# Patient Record
Sex: FEMALE | Race: WHITE | NOT HISPANIC OR LATINO | URBAN - METROPOLITAN AREA
[De-identification: names, ages, dates, MRNs, and addresses within clinical notes are randomized per-mention and may not be internally consistent; named-entity substitution may affect disease eponyms.]

---

## 2019-01-31 ENCOUNTER — EMERGENCY (EMERGENCY)
Facility: HOSPITAL | Age: 69
LOS: 0 days | Discharge: HOME | End: 2019-01-31
Attending: EMERGENCY MEDICINE | Admitting: EMERGENCY MEDICINE
Payer: MEDICARE

## 2019-01-31 VITALS
OXYGEN SATURATION: 100 % | HEART RATE: 76 BPM | DIASTOLIC BLOOD PRESSURE: 74 MMHG | SYSTOLIC BLOOD PRESSURE: 136 MMHG | RESPIRATION RATE: 18 BRPM

## 2019-01-31 VITALS — WEIGHT: 175.05 LBS

## 2019-01-31 DIAGNOSIS — K62.5 HEMORRHAGE OF ANUS AND RECTUM: ICD-10-CM

## 2019-01-31 DIAGNOSIS — Z79.82 LONG TERM (CURRENT) USE OF ASPIRIN: ICD-10-CM

## 2019-01-31 DIAGNOSIS — K92.2 GASTROINTESTINAL HEMORRHAGE, UNSPECIFIED: ICD-10-CM

## 2019-01-31 DIAGNOSIS — R19.7 DIARRHEA, UNSPECIFIED: ICD-10-CM

## 2019-01-31 LAB
ABO RH CONFIRMATION: SIGNIFICANT CHANGE UP
ALBUMIN SERPL ELPH-MCNC: 3.8 G/DL — SIGNIFICANT CHANGE UP (ref 3.3–5)
ALP SERPL-CCNC: 87 U/L — SIGNIFICANT CHANGE UP (ref 40–120)
ALT FLD-CCNC: 34 U/L — SIGNIFICANT CHANGE UP (ref 12–78)
ANION GAP SERPL CALC-SCNC: 7 MMOL/L — SIGNIFICANT CHANGE UP (ref 5–17)
APTT BLD: 29.8 SEC — SIGNIFICANT CHANGE UP (ref 27.5–36.3)
AST SERPL-CCNC: 21 U/L — SIGNIFICANT CHANGE UP (ref 15–37)
BASOPHILS # BLD AUTO: 0.02 K/UL — SIGNIFICANT CHANGE UP (ref 0–0.2)
BASOPHILS NFR BLD AUTO: 0.3 % — SIGNIFICANT CHANGE UP (ref 0–2)
BILIRUB SERPL-MCNC: 0.4 MG/DL — SIGNIFICANT CHANGE UP (ref 0.2–1.2)
BLD GP AB SCN SERPL QL: SIGNIFICANT CHANGE UP
BUN SERPL-MCNC: 21 MG/DL — SIGNIFICANT CHANGE UP (ref 7–23)
CALCIUM SERPL-MCNC: 8.9 MG/DL — SIGNIFICANT CHANGE UP (ref 8.5–10.1)
CHLORIDE SERPL-SCNC: 106 MMOL/L — SIGNIFICANT CHANGE UP (ref 96–108)
CO2 SERPL-SCNC: 25 MMOL/L — SIGNIFICANT CHANGE UP (ref 22–31)
CREAT SERPL-MCNC: 0.88 MG/DL — SIGNIFICANT CHANGE UP (ref 0.5–1.3)
EOSINOPHIL # BLD AUTO: 0.07 K/UL — SIGNIFICANT CHANGE UP (ref 0–0.5)
EOSINOPHIL NFR BLD AUTO: 0.9 % — SIGNIFICANT CHANGE UP (ref 0–6)
GLUCOSE SERPL-MCNC: 114 MG/DL — HIGH (ref 70–99)
HCT VFR BLD CALC: 43.7 % — SIGNIFICANT CHANGE UP (ref 34.5–45)
HGB BLD-MCNC: 14.7 G/DL — SIGNIFICANT CHANGE UP (ref 11.5–15.5)
IMM GRANULOCYTES NFR BLD AUTO: 0.3 % — SIGNIFICANT CHANGE UP (ref 0–1.5)
INR BLD: 0.99 RATIO — SIGNIFICANT CHANGE UP (ref 0.88–1.16)
LACTATE SERPL-SCNC: 1.1 MMOL/L — SIGNIFICANT CHANGE UP (ref 0.7–2)
LIDOCAIN IGE QN: 140 U/L — SIGNIFICANT CHANGE UP (ref 73–393)
LYMPHOCYTES # BLD AUTO: 1.21 K/UL — SIGNIFICANT CHANGE UP (ref 1–3.3)
LYMPHOCYTES # BLD AUTO: 15.5 % — SIGNIFICANT CHANGE UP (ref 13–44)
MCHC RBC-ENTMCNC: 30.2 PG — SIGNIFICANT CHANGE UP (ref 27–34)
MCHC RBC-ENTMCNC: 33.6 GM/DL — SIGNIFICANT CHANGE UP (ref 32–36)
MCV RBC AUTO: 89.9 FL — SIGNIFICANT CHANGE UP (ref 80–100)
MONOCYTES # BLD AUTO: 0.79 K/UL — SIGNIFICANT CHANGE UP (ref 0–0.9)
MONOCYTES NFR BLD AUTO: 10.1 % — SIGNIFICANT CHANGE UP (ref 2–14)
NEUTROPHILS # BLD AUTO: 5.72 K/UL — SIGNIFICANT CHANGE UP (ref 1.8–7.4)
NEUTROPHILS NFR BLD AUTO: 72.9 % — SIGNIFICANT CHANGE UP (ref 43–77)
NRBC # BLD: 0 /100 WBCS — SIGNIFICANT CHANGE UP (ref 0–0)
PLATELET # BLD AUTO: 247 K/UL — SIGNIFICANT CHANGE UP (ref 150–400)
POTASSIUM SERPL-MCNC: 3.6 MMOL/L — SIGNIFICANT CHANGE UP (ref 3.5–5.3)
POTASSIUM SERPL-SCNC: 3.6 MMOL/L — SIGNIFICANT CHANGE UP (ref 3.5–5.3)
PROT SERPL-MCNC: 8.1 GM/DL — SIGNIFICANT CHANGE UP (ref 6–8.3)
PROTHROM AB SERPL-ACNC: 11 SEC — SIGNIFICANT CHANGE UP (ref 10–12.9)
RBC # BLD: 4.86 M/UL — SIGNIFICANT CHANGE UP (ref 3.8–5.2)
RBC # FLD: 14 % — SIGNIFICANT CHANGE UP (ref 10.3–14.5)
SODIUM SERPL-SCNC: 138 MMOL/L — SIGNIFICANT CHANGE UP (ref 135–145)
TYPE + AB SCN PNL BLD: SIGNIFICANT CHANGE UP
WBC # BLD: 7.83 K/UL — SIGNIFICANT CHANGE UP (ref 3.8–10.5)
WBC # FLD AUTO: 7.83 K/UL — SIGNIFICANT CHANGE UP (ref 3.8–10.5)

## 2019-01-31 PROCEDURE — 99284 EMERGENCY DEPT VISIT MOD MDM: CPT | Mod: 25

## 2019-01-31 RX ORDER — SODIUM CHLORIDE 9 MG/ML
1000 INJECTION INTRAMUSCULAR; INTRAVENOUS; SUBCUTANEOUS ONCE
Qty: 0 | Refills: 0 | Status: COMPLETED | OUTPATIENT
Start: 2019-01-31 | End: 2019-01-31

## 2019-01-31 RX ADMIN — SODIUM CHLORIDE 1000 MILLILITER(S): 9 INJECTION INTRAMUSCULAR; INTRAVENOUS; SUBCUTANEOUS at 18:00

## 2019-01-31 RX ADMIN — SODIUM CHLORIDE 1000 MILLILITER(S): 9 INJECTION INTRAMUSCULAR; INTRAVENOUS; SUBCUTANEOUS at 19:10

## 2019-01-31 NOTE — ED ADULT NURSE NOTE - NSIMPLEMENTINTERV_GEN_ALL_ED
Implemented All Fall with Harm Risk Interventions:  [place] to call system. Call bell, personal items and telephone within reach. Instruct patient to call for assistance. Room bathroom lighting operational. Non-slip footwear when patient is off stretcher. Physically safe environment: no spills, clutter or unnecessary equipment. Stretcher in lowest position, wheels locked, appropriate side rails in place. Provide visual cue, wrist band, yellow gown, etc. Monitor gait and stability. Monitor for mental status changes and reorient to person, place, and time. Review medications for side effects contributing to fall risk. Reinforce activity limits and safety measures with patient and family. Provide visual clues: red socks.

## 2019-01-31 NOTE — ED ADULT NURSE NOTE - CHPI ED NUR SYMPTOMS NEG
no chills/no dysuria/no hematuria/no vomiting/no fever/no nausea/no burning urination/no abdominal distension/no diarrhea

## 2019-01-31 NOTE — ED PROVIDER NOTE - GASTROINTESTINAL, MLM
Abdomen soft, mild epigastric tenderness w/o any guarding. Abdomen soft, mild epigastric tenderness w/o any guarding. Rectal w. non bleeding external hemorrhoids, no stool or blood in rectal vault - Chaperone Juan C Jane

## 2019-01-31 NOTE — ED PROVIDER NOTE - ATTENDING CONTRIBUTION TO CARE
Dr. Washington: I have personally performed a face to face bedside history and physical examination of this patient. I have discussed the history, examination, review of systems, assessment and plan of management with the resident. I have reviewed the electronic medical record and amended it to reflect my history, review of systems, physical exam, assessment and plan.

## 2019-01-31 NOTE — ED ADULT NURSE NOTE - OBJECTIVE STATEMENT
pt states multiple episodes of blood tinged bowel movements since this morning w/ associated abd cramping. denies weakness, vomiting recent travel or sick contacts

## 2019-01-31 NOTE — ED PROVIDER NOTE - CARE PLAN
Principal Discharge DX:	BRBPR (bright red blood per rectum)  Assessment and plan of treatment:	1) Please return to the ED should you have any new or worsening symptoms, worsening pain, develop worsening bleeding or any concerning symptoms  2) Please follow up with Dr. Espinosa tomorrow - please call office  3) Please hold aspirin until GI bleeding resolves

## 2019-01-31 NOTE — ED PROVIDER NOTE - MEDICAL DECISION MAKING DETAILS
Omar Lozano (Resident): 24 hours of bright red blood mix w/ diarrhea - no fevers, abd benign - low concern for C. diff - given quantity of BRB, would consider admission for IV hydration and possible colonoscopy -

## 2019-01-31 NOTE — ED PROVIDER NOTE - PROGRESS NOTE DETAILS
Jimi NP for Dr. Washington: 69 y/o female with a PMHx of glaucoma, T cell lymphoma, carotid artery dissection, on baby ASA presents to the ED c/o dairrhea and GI bleed since last night. Pt had 3 episodes of diarrhea last night, then 1 episode of stool with blood and then had multiple episodes of just blood when using the bathroom. Blood was bright red. Pt states she has a h/o of diarrhea when eating certain meals. Denies fever, SOB or pain. On ASA. Recent Augmentin use for bronchitis 3 weeks ago.  Nonsmoker. Occasional EtOH use. No recent travel. Allergies: Vibramycin, Cipro. PMD: Dr. Sosa.    Physical Exam: CONSTITUTIONAL: white female adult. Alert. Not acutely ill. well appearing, well nourished, and in no apparent distress. EYES: clear bilaterally.  EOMI. +conjunctivae pink. Pupils equal, round, and reactive to light. ENMT: Nasal mucosa clear.  Mouth with normal mucosa  Throat has no vesicles, no oropharyngeal exudates and uvula is midline. CARDIAC: normal rate, regular rhythm, and no murmur. Normal radial pulse. RESPIRATORY: breath sounds clear and equal bilaterally. Respirations normal. GASTROINTESTINAL: abdomen soft, non-tender and non-distended. Bowel sounds positive. Rectal exam as per resident. MUSCULOSKELETAL: range of motion is not limited and there is no muscle tenderness. MAEx4 no focal swelling, tenderness. NEUROLOGICAL: sensation is normal and strength is normal. SKIN: skin normal color for race, warm, dry and intact. No pallor or diaphoresis. Jimi NP for Dr. Washington: 69 y/o female with a PMHx of glaucoma, T cell lymphoma, carotid artery dissection, on baby ASA presents to the ED c/o dairrhea and GI bleed since last night. Pt had 3 episodes of diarrhea last night, then 1 episode of stool with blood and then had multiple episodes of just blood when using the bathroom. Blood was bright red. Pt states she has a h/o of diarrhea when eating certain meals. Denies fever, SOB or pain. Recent Augmentin use for bronchitis 3 weeks ago.  Nonsmoker. Occasional EtOH use. No recent travel. Allergies: Vibramycin, Cipro. PMD: Dr. Sosa.    Physical Exam: CONSTITUTIONAL: white female adult. Alert. Not acutely ill. well appearing, well nourished, and in no apparent distress. EYES: clear bilaterally.  EOMI. +conjunctivae pink. Pupils equal, round, and reactive to light. ENMT: Nasal mucosa clear.  Mouth with normal mucosa  Throat has no vesicles, no oropharyngeal exudates and uvula is midline. CARDIAC: normal rate, regular rhythm, and no murmur. Normal radial pulse. RESPIRATORY: breath sounds clear and equal bilaterally. Respirations normal. GASTROINTESTINAL: abdomen soft, non-tender and non-distended. Bowel sounds positive. Rectal exam as per resident. MUSCULOSKELETAL: range of motion is not limited and there is no muscle tenderness. MAEx4 no focal swelling, tenderness. NEUROLOGICAL: sensation is normal and strength is normal. SKIN: skin normal color for race, warm, dry and intact. No pallor or diaphoresis. Omar Lozano (Resident): paged Dr. Espinosa Omar Lozano (Resident): put Omar Lozano (Resident): spoke with covering GI MD - states can not guarantee that patient will be seen tomorrow but they will do their best to arrange her to be seen - d/w him that patient h/h stable, no more bloody diarrhea since being in the ED, and abd cramping resolved - d/w patient and  would be reasonable to admit for repeat H/H and poss colonoscopy, but have plans to celebrate hysbadns birthday tomorrow and would rather not stay - explaiend to patient and  to monitor closely and if she has worsening bleeding to return to ED - state they live close by and will come back if anything changes - will call office of Dr. PALMER tomorrow for appointment -

## 2019-01-31 NOTE — ED STATDOCS - PROGRESS NOTE DETAILS
Jimi Childress for attending Dr. Hankins: 69 y/o female with a PMHx of glaucoma, T cell lymphoma presents to the ED c/o GI bleed. Pt notes bleeding after a BM last night. Blood was bright red. +abd cramping, +diarrhea, +lightheadedness. Denies fever. No recent raw food consumption. On ASA. Recent Augmentin use for Bronchitis 3 weeks ago. Nonsmoker. Occasional EtOH use. No recent travel. GI: Dr. Espinosa. No other complaints at this time. Will send pt to main ED for further evaluation. Jimi Childress for attending Dr. Hnakins: 69 y/o female with a PMHx of glaucoma, T cell lymphoma presents to the ED c/o GI bleed. Pt notes bleeding after a BM last night. Blood was bright red. +abd cramping, +diarrhea, +lightheadedness. Denies fever. On ASA. Recent Augmentin use for bronchitis 3 weeks ago. No recent raw food consumption. Nonsmoker. Occasional EtOH use. No recent travel. Allergies: Vibramycin, Cipro. GI: Dr. Espinosa. No other complaints at this time. Will send pt to main ED for further evaluation.

## 2019-01-31 NOTE — ED ADULT TRIAGE NOTE - CHIEF COMPLAINT QUOTE
pt c/o bright red rectal bleeding, pt states that it started last night and when she feels she has to pass a bowel movement, mucus and blood comes out. pt denies abd pain, vomitting nausea or fever,  pt states she has hx of hemmorhoids, no hx of Gi bleed or illness., pt denies SOb dizziness or weakness

## 2019-01-31 NOTE — ED PROVIDER NOTE - OBJECTIVE STATEMENT
69 y/o female PMH of T cell lymphoma presents to the ED w/ GI bleed. Patent states that last night she had a soft BP with Bright red blood. Patinet also reports some abdominal cramping. No f/c, N/V/D, chest pain, diff breathing. No recent travel. Does take aspirin. Patient states that she had a URI 3 weeks ago and was placed on Augmentin. 69 y/o female PMH of T cell lymphoma presents to the ED w/ GI bleed. Patent states that last night she had a soft BP with Bright red blood. Patient also reports some abdominal cramping. No f/c, N/V/D, chest pain, diff breathing, urinary symptoms. No recent travel. Does take aspirin. Patient states that she had a URI 3 weeks ago and was placed on Augmentin which she completed. Never had a colonoscopy. States 8 episodes of diarrhea in past 24 hours, all with minimal stool mixed w/ blood (has a picture). 69 y/o female PMH of T cell lymphoma presents to the ED w/ GI bleed. Patent states that last night she had a soft BP with Bright red blood. Patient also reports some abdominal cramping. No f/c, N/V/D, chest pain, diff breathing, urinary symptoms. No recent travel. Does take aspirin. Patient states that she had a URI 3 weeks ago and was placed on Augmentin which she completed. Never had a colonoscopy. States 8 episodes of diarrhea in past 24 hours, all with minimal stool mixed w/ blood (has a picture).  GI: Dr. Espinosa. 67 y/o female PMH of T cell lymphoma presents to the ED w/ GI bleed. Patent states that last night she had a soft BP with Bright red blood. Patient also reports some abdominal cramping. No f/c, N/V/D, chest pain, diff breathing, urinary symptoms. No recent travel. Does take aspirin. Patient states that she had a URI 3 weeks ago and was placed on Augmentin which she completed. Never had a colonoscopy. States 8 episodes of diarrhea in past 24 hours, all with minimal stool mixed w/ blood (has a picture).  GI: Dr. Espinosa [phone]

## 2020-05-27 NOTE — ED PROVIDER NOTE - PLAN OF CARE
decreased strength/pain/impaired postural control/impaired balance
1) Please return to the ED should you have any new or worsening symptoms, worsening pain, develop worsening bleeding or any concerning symptoms  2) Please follow up with Dr. Espinosa tomorrow - please call office  3) Please hold aspirin until GI bleeding resolves